# Patient Record
Sex: MALE | Race: WHITE | NOT HISPANIC OR LATINO | URBAN - METROPOLITAN AREA
[De-identification: names, ages, dates, MRNs, and addresses within clinical notes are randomized per-mention and may not be internally consistent; named-entity substitution may affect disease eponyms.]

---

## 2018-03-10 ENCOUNTER — EMERGENCY (EMERGENCY)
Facility: HOSPITAL | Age: 48
LOS: 0 days | Discharge: HOME | End: 2018-03-10
Attending: EMERGENCY MEDICINE | Admitting: EMERGENCY MEDICINE
Payer: COMMERCIAL

## 2018-03-10 VITALS
HEART RATE: 72 BPM | SYSTOLIC BLOOD PRESSURE: 121 MMHG | OXYGEN SATURATION: 99 % | RESPIRATION RATE: 17 BRPM | DIASTOLIC BLOOD PRESSURE: 76 MMHG

## 2018-03-10 VITALS — HEIGHT: 70 IN | WEIGHT: 274.92 LBS

## 2018-03-10 DIAGNOSIS — N23 UNSPECIFIED RENAL COLIC: ICD-10-CM

## 2018-03-10 DIAGNOSIS — R10.32 LEFT LOWER QUADRANT PAIN: ICD-10-CM

## 2018-03-10 LAB
ALBUMIN SERPL ELPH-MCNC: 4.1 G/DL — SIGNIFICANT CHANGE UP (ref 3.3–5)
ALP SERPL-CCNC: 54 U/L — SIGNIFICANT CHANGE UP (ref 40–120)
ALT FLD-CCNC: 34 U/L — SIGNIFICANT CHANGE UP (ref 12–78)
ANION GAP SERPL CALC-SCNC: 8 MMOL/L — SIGNIFICANT CHANGE UP (ref 5–17)
APPEARANCE UR: CLEAR — SIGNIFICANT CHANGE UP
AST SERPL-CCNC: 22 U/L — SIGNIFICANT CHANGE UP (ref 15–37)
BASOPHILS # BLD AUTO: 0.03 K/UL — SIGNIFICANT CHANGE UP (ref 0–0.2)
BASOPHILS NFR BLD AUTO: 0.4 % — SIGNIFICANT CHANGE UP (ref 0–2)
BILIRUB SERPL-MCNC: 0.6 MG/DL — SIGNIFICANT CHANGE UP (ref 0.2–1.2)
BILIRUB UR-MCNC: NEGATIVE — SIGNIFICANT CHANGE UP
BUN SERPL-MCNC: 15 MG/DL — SIGNIFICANT CHANGE UP (ref 7–23)
CALCIUM SERPL-MCNC: 9.2 MG/DL — SIGNIFICANT CHANGE UP (ref 8.5–10.1)
CHLORIDE SERPL-SCNC: 104 MMOL/L — SIGNIFICANT CHANGE UP (ref 96–108)
CO2 SERPL-SCNC: 26 MMOL/L — SIGNIFICANT CHANGE UP (ref 22–31)
COLOR SPEC: YELLOW — SIGNIFICANT CHANGE UP
CREAT SERPL-MCNC: 1.29 MG/DL — SIGNIFICANT CHANGE UP (ref 0.5–1.3)
DIFF PNL FLD: (no result)
EOSINOPHIL # BLD AUTO: 0.08 K/UL — SIGNIFICANT CHANGE UP (ref 0–0.5)
EOSINOPHIL NFR BLD AUTO: 1.1 % — SIGNIFICANT CHANGE UP (ref 0–6)
EPI CELLS # UR: SIGNIFICANT CHANGE UP
GLUCOSE SERPL-MCNC: 95 MG/DL — SIGNIFICANT CHANGE UP (ref 70–99)
GLUCOSE UR QL: NEGATIVE MG/DL — SIGNIFICANT CHANGE UP
HCT VFR BLD CALC: 42.4 % — SIGNIFICANT CHANGE UP (ref 39–50)
HGB BLD-MCNC: 14.6 G/DL — SIGNIFICANT CHANGE UP (ref 13–17)
IMM GRANULOCYTES NFR BLD AUTO: 0.4 % — SIGNIFICANT CHANGE UP (ref 0–1.5)
INR BLD: 1.05 RATIO — SIGNIFICANT CHANGE UP (ref 0.88–1.16)
KETONES UR-MCNC: NEGATIVE — SIGNIFICANT CHANGE UP
LEUKOCYTE ESTERASE UR-ACNC: NEGATIVE — SIGNIFICANT CHANGE UP
LYMPHOCYTES # BLD AUTO: 1.34 K/UL — SIGNIFICANT CHANGE UP (ref 1–3.3)
LYMPHOCYTES # BLD AUTO: 17.9 % — SIGNIFICANT CHANGE UP (ref 13–44)
MCHC RBC-ENTMCNC: 30.5 PG — SIGNIFICANT CHANGE UP (ref 27–34)
MCHC RBC-ENTMCNC: 34.4 GM/DL — SIGNIFICANT CHANGE UP (ref 32–36)
MCV RBC AUTO: 88.5 FL — SIGNIFICANT CHANGE UP (ref 80–100)
MONOCYTES # BLD AUTO: 0.63 K/UL — SIGNIFICANT CHANGE UP (ref 0–0.9)
MONOCYTES NFR BLD AUTO: 8.4 % — SIGNIFICANT CHANGE UP (ref 2–14)
NEUTROPHILS # BLD AUTO: 5.39 K/UL — SIGNIFICANT CHANGE UP (ref 1.8–7.4)
NEUTROPHILS NFR BLD AUTO: 71.8 % — SIGNIFICANT CHANGE UP (ref 43–77)
NITRITE UR-MCNC: NEGATIVE — SIGNIFICANT CHANGE UP
NRBC # BLD: 0 /100 WBCS — SIGNIFICANT CHANGE UP (ref 0–0)
PH UR: 6.5 — SIGNIFICANT CHANGE UP (ref 5–8)
PLATELET # BLD AUTO: 226 K/UL — SIGNIFICANT CHANGE UP (ref 150–400)
POTASSIUM SERPL-MCNC: 3.8 MMOL/L — SIGNIFICANT CHANGE UP (ref 3.5–5.3)
POTASSIUM SERPL-SCNC: 3.8 MMOL/L — SIGNIFICANT CHANGE UP (ref 3.5–5.3)
PROT SERPL-MCNC: 7.6 GM/DL — SIGNIFICANT CHANGE UP (ref 6–8.3)
PROT UR-MCNC: NEGATIVE MG/DL — SIGNIFICANT CHANGE UP
PROTHROM AB SERPL-ACNC: 11.4 SEC — SIGNIFICANT CHANGE UP (ref 9.8–12.7)
RBC # BLD: 4.79 M/UL — SIGNIFICANT CHANGE UP (ref 4.2–5.8)
RBC # FLD: 12.2 % — SIGNIFICANT CHANGE UP (ref 10.3–14.5)
RBC CASTS # UR COMP ASSIST: (no result) /HPF (ref 0–4)
SODIUM SERPL-SCNC: 138 MMOL/L — SIGNIFICANT CHANGE UP (ref 135–145)
SP GR SPEC: 1.01 — SIGNIFICANT CHANGE UP (ref 1.01–1.02)
UROBILINOGEN FLD QL: NEGATIVE MG/DL — SIGNIFICANT CHANGE UP
WBC # BLD: 7.5 K/UL — SIGNIFICANT CHANGE UP (ref 3.8–10.5)
WBC # FLD AUTO: 7.5 K/UL — SIGNIFICANT CHANGE UP (ref 3.8–10.5)
WBC UR QL: SIGNIFICANT CHANGE UP

## 2018-03-10 PROCEDURE — 74176 CT ABD & PELVIS W/O CONTRAST: CPT | Mod: 26

## 2018-03-10 PROCEDURE — 99285 EMERGENCY DEPT VISIT HI MDM: CPT

## 2018-03-10 RX ORDER — SODIUM CHLORIDE 9 MG/ML
1000 INJECTION INTRAMUSCULAR; INTRAVENOUS; SUBCUTANEOUS ONCE
Qty: 0 | Refills: 0 | Status: COMPLETED | OUTPATIENT
Start: 2018-03-10 | End: 2018-03-10

## 2018-03-10 RX ORDER — ONDANSETRON 8 MG/1
4 TABLET, FILM COATED ORAL ONCE
Qty: 0 | Refills: 0 | Status: COMPLETED | OUTPATIENT
Start: 2018-03-10 | End: 2018-03-10

## 2018-03-10 RX ADMIN — SODIUM CHLORIDE 1000 MILLILITER(S): 9 INJECTION INTRAMUSCULAR; INTRAVENOUS; SUBCUTANEOUS at 11:32

## 2018-03-10 NOTE — ED STATDOCS - PROGRESS NOTE DETAILS
47 yr. old male PMH: GERD, Sleep Apnea presents to ED with pain in left flank onset 2:30 am felt urge to void bu unable until 8 am after drinking fluids. Unable to sleep last night. No fever or chills. No N/V . Seen and examined by attending in Intake. Plan: IV,IVF, Labs, UA/UC, CT abd./pelvis. Will F/U with results and re evaluate. Matt NP

## 2018-03-10 NOTE — ED ADULT NURSE NOTE - OBJECTIVE STATEMENT
Pt presents with L sided flank pain, and urine urgency. Pt states "I feel like I have to go but cannot go" Pt denies any hematuria.

## 2018-03-10 NOTE — ED STATDOCS - MEDICAL DECISION MAKING DETAILS
Pt with renal colic, no signs of infection, normal renal function.  Okay for d/c home, supportive care.

## 2018-03-10 NOTE — ED ADULT TRIAGE NOTE - CHIEF COMPLAINT QUOTE
Pt presents to ED c/o left flank pain starting at 2am. Pt reports he hasn't urinated since 2am. Pt denies hematuria and N/V

## 2018-03-10 NOTE — ED STATDOCS - OBJECTIVE STATEMENT
46 y/o male with PMHx of GERD, sleep apnea presents to the ED c/o sharp left-sided flank pain that woke pt up around 2:30am. Went to restroom and attempted to return to sleep, but could not find a comfortable position that provided relief. Took Advil, Tylenol which offered no relief. Hydrated with fluids which offered mild relief. +increased urinary urgency with retention this morning. Denies burning in urination, hematuria.

## 2018-03-11 LAB
CULTURE RESULTS: NO GROWTH — SIGNIFICANT CHANGE UP
SPECIMEN SOURCE: SIGNIFICANT CHANGE UP

## 2018-05-22 ENCOUNTER — EMERGENCY (EMERGENCY)
Facility: HOSPITAL | Age: 48
LOS: 0 days | Discharge: HOME | End: 2018-05-22
Attending: EMERGENCY MEDICINE | Admitting: EMERGENCY MEDICINE
Payer: COMMERCIAL

## 2018-05-22 VITALS
OXYGEN SATURATION: 100 % | TEMPERATURE: 98 F | DIASTOLIC BLOOD PRESSURE: 96 MMHG | HEART RATE: 88 BPM | SYSTOLIC BLOOD PRESSURE: 130 MMHG | RESPIRATION RATE: 19 BRPM

## 2018-05-22 VITALS — WEIGHT: 279.99 LBS

## 2018-05-22 DIAGNOSIS — M54.5 LOW BACK PAIN: ICD-10-CM

## 2018-05-22 DIAGNOSIS — Z90.49 ACQUIRED ABSENCE OF OTHER SPECIFIED PARTS OF DIGESTIVE TRACT: Chronic | ICD-10-CM

## 2018-05-22 DIAGNOSIS — M54.30 SCIATICA, UNSPECIFIED SIDE: ICD-10-CM

## 2018-05-22 PROBLEM — G47.30 SLEEP APNEA, UNSPECIFIED: Chronic | Status: ACTIVE | Noted: 2018-03-10

## 2018-05-22 PROBLEM — K21.9 GASTRO-ESOPHAGEAL REFLUX DISEASE WITHOUT ESOPHAGITIS: Chronic | Status: ACTIVE | Noted: 2018-03-10

## 2018-05-22 PROCEDURE — 99283 EMERGENCY DEPT VISIT LOW MDM: CPT

## 2018-05-22 RX ORDER — KETOROLAC TROMETHAMINE 30 MG/ML
30 SYRINGE (ML) INJECTION ONCE
Qty: 0 | Refills: 0 | Status: DISCONTINUED | OUTPATIENT
Start: 2018-05-22 | End: 2018-05-22

## 2018-05-22 RX ORDER — DIAZEPAM 5 MG
5 TABLET ORAL ONCE
Qty: 0 | Refills: 0 | Status: DISCONTINUED | OUTPATIENT
Start: 2018-05-22 | End: 2018-05-22

## 2018-05-22 RX ORDER — LIDOCAINE 4 G/100G
1 CREAM TOPICAL ONCE
Qty: 0 | Refills: 0 | Status: COMPLETED | OUTPATIENT
Start: 2018-05-22 | End: 2018-05-22

## 2018-05-22 RX ADMIN — LIDOCAINE 1 PATCH: 4 CREAM TOPICAL at 10:46

## 2018-05-22 RX ADMIN — Medication 5 MILLIGRAM(S): at 10:46

## 2018-05-22 RX ADMIN — Medication 30 MILLIGRAM(S): at 10:46

## 2018-05-22 NOTE — ED STATDOCS - OBJECTIVE STATEMENT
46 y/o male with pmhx GERD and sleep apnea presents to the ED c/o  left lower back pain starting 4 days ago. Pt describes the pain as sharp, radiating down back of left leg, stopping at the knee, worsening w/ movement. No numbness / tingling or weakness. No urinary retention or stool incontinence. No fevers and denies trauma. patient was prescribed with flexeril at urgent care. Has also been using naproxen and ibuprofen w/o improvement of symptoms.

## 2018-05-22 NOTE — ED STATDOCS - ATTENDING CONTRIBUTION TO CARE
I, Francisco Plunkett, performed the initial face to face bedside interview with this patient regarding history of present illness, review of symptoms and relevant past medical, social and family history.  I completed an independent physical examination.  I was the initial provider who evaluated this patient. I have signed out the follow up of any pending tests (i.e. labs, radiological studies) to the Resident.  I have communicated the patient’s plan of care and disposition with the Resident.  The history, relevant review of systems, past medical and surgical history, medical decision making, and physical examination was documented by the scribe in my presence and I attest to the accuracy of the documentation.

## 2018-05-22 NOTE — ED PROVIDER NOTE - OBJECTIVE STATEMENT
46 y/o male w/ no pmh p/w back pain. Symptoms started w/ left lower back pain started 4 days ago. Described as sharp, radiating down back of left leg. Worse w/ movement. No numbness / tingling or weakness. No urinary retention or stool incontinence. No fevers and denies trauma. patient has been using flexeril, naproxen and ibuprofen w/o improvement of symptoms.

## 2018-05-22 NOTE — ED ADULT TRIAGE NOTE - CHIEF COMPLAINT QUOTE
Pt c/o back pain since friday, seen at [facility] on sunday placed on naproxen and flexiril with no relief. pt denies urinary symptoms, no fevers fall or trauma

## 2018-05-22 NOTE — ED STATDOCS - MEDICAL DECISION MAKING DETAILS
pt w/ back pain. Given hx and physical, suspicious for sciatica. Will provide anagleics, obs in ED, plan to dispo to home w/ pmd followup

## 2018-05-22 NOTE — ED STATDOCS - PROGRESS NOTE DETAILS
So Huang MD PGY4  patient reporting improvement of symptoms. ambulatory at bedside. will follow up with pmd, ortho spine referrals given

## 2018-05-22 NOTE — ED ADULT NURSE NOTE - OBJECTIVE STATEMENT
pt presents to eD with left lower back pain radiating down left lower extremity x 4 days.  denies recent injury. denies numbness/tingling. pt ambulates with pain. will; continue to montior and reassess

## 2018-05-22 NOTE — ED ADULT NURSE NOTE - CHIEF COMPLAINT QUOTE
Pt c/o back pain since friday, seen at [facility] on sunday placed on naproxen and flexiril with no relief. pt denies urinary symptoms, no fevers fall or trauma

## 2018-11-20 NOTE — ED ADULT NURSE NOTE - CAS ELECT INFOMATION PROVIDED
as per the pt " I was in the urgent care for cough and I had Syncopal episode I had the "
DC instructions

## 2019-10-04 PROBLEM — Z00.00 ENCOUNTER FOR PREVENTIVE HEALTH EXAMINATION: Status: ACTIVE | Noted: 2019-10-04

## 2019-10-08 ENCOUNTER — APPOINTMENT (OUTPATIENT)
Dept: UROLOGY | Facility: CLINIC | Age: 49
End: 2019-10-08
Payer: COMMERCIAL

## 2019-10-08 VITALS
SYSTOLIC BLOOD PRESSURE: 99 MMHG | OXYGEN SATURATION: 98 % | TEMPERATURE: 99.6 F | HEIGHT: 70 IN | BODY MASS INDEX: 26.63 KG/M2 | HEART RATE: 85 BPM | WEIGHT: 186 LBS | DIASTOLIC BLOOD PRESSURE: 67 MMHG

## 2019-10-08 DIAGNOSIS — N13.8 BENIGN PROSTATIC HYPERPLASIA WITH LOWER URINARY TRACT SYMPMS: ICD-10-CM

## 2019-10-08 DIAGNOSIS — Z86.69 PERSONAL HISTORY OF OTHER DISEASES OF THE NERVOUS SYSTEM AND SENSE ORGANS: ICD-10-CM

## 2019-10-08 DIAGNOSIS — Z86.39 PERSONAL HISTORY OF OTHER ENDOCRINE, NUTRITIONAL AND METABOLIC DISEASE: ICD-10-CM

## 2019-10-08 DIAGNOSIS — N40.1 BENIGN PROSTATIC HYPERPLASIA WITH LOWER URINARY TRACT SYMPMS: ICD-10-CM

## 2019-10-08 PROCEDURE — 99204 OFFICE O/P NEW MOD 45 MIN: CPT

## 2019-10-08 NOTE — REVIEW OF SYSTEMS
[see HPI] : see HPI [Fever] : fever [Chills] : chills [Feeling Tired] : feeling tired [Negative] : Heme/Lymph

## 2019-10-12 PROBLEM — Z86.69 HISTORY OF SLEEP APNEA: Status: RESOLVED | Noted: 2019-10-08 | Resolved: 2019-10-12

## 2019-10-12 PROBLEM — Z86.39 HISTORY OF HYPERCHOLESTEROLEMIA: Status: RESOLVED | Noted: 2019-10-08 | Resolved: 2019-10-12

## 2019-10-12 RX ORDER — MULTIVIT-MIN/IRON FUM/FOLIC AC 7.5 MG-4
TABLET ORAL
Refills: 0 | Status: ACTIVE | COMMUNITY

## 2019-10-12 NOTE — HISTORY OF PRESENT ILLNESS
[FreeTextEntry1] : 48 year old man seen 10/08/2019 with complaint of an episode of difficulty urinating 2 weeks ago and had a burning sensation when he was able to go. This began 2 weeks ago. Patient states he had some beer and had increased hesitancy and straining. Patient reports he had some dysuria after which has since resolved. Patient admits to hesitancy at times. \par It is moderate in severity. Nothing makes the symptoms better, drinking alcohol makes sx worse. \par It is associated with nothing.\par No nocturia, no hematuria, no frequency, no urgency,  No incontinence. \par No fevers, no chills, no nausea, no vomiting, no flank pain. \par \par No family history contributory to obstructive uropathy.\par

## 2019-10-12 NOTE — PHYSICAL EXAM
[General Appearance - Well Developed] : well developed [General Appearance - Well Nourished] : well nourished [Normal Appearance] : normal appearance [Well Groomed] : well groomed [General Appearance - In No Acute Distress] : no acute distress [Edema] : no peripheral edema [Respiration, Rhythm And Depth] : normal respiratory rhythm and effort [Exaggerated Use Of Accessory Muscles For Inspiration] : no accessory muscle use [Abdomen Soft] : soft [Abdomen Tenderness] : non-tender [Costovertebral Angle Tenderness] : no ~M costovertebral angle tenderness [Urethral Meatus] : meatus normal [Urinary Bladder Findings] : the bladder was normal on palpation [Scrotum] : the scrotum was normal [Testes Mass (___cm)] : there were no testicular masses [No Prostate Nodules] : no prostate nodules [Prostate Tenderness] : the prostate was not tender [Prostate Size ___ gm] : prostate size [unfilled] gm [Prostate Enlarged] : was enlarged [Normal Station and Gait] : the gait and station were normal for the patient's age [] : no rash [No Focal Deficits] : no focal deficits [Oriented To Time, Place, And Person] : oriented to person, place, and time [Affect] : the affect was normal [Not Anxious] : not anxious [Mood] : the mood was normal [No Palpable Adenopathy] : no palpable adenopathy [Prostate Nodule] : did not have a nodule [Prostate Tenderness] : was not tender [Prostate Fluctuant] : was not fluctuant

## 2019-10-12 NOTE — ASSESSMENT
[FreeTextEntry1] : 48 year old man presents with some hesitancy and straining consistent with BPH.\par \par The anatomy of the lower genitourinary tract was explained to the patient, with the urine passing through the bladder neck and prostatic urethra as it exits the body. Prostatic enlargement can constrict the lumen and the bladder outlet, causing incomplete bladder emptying and irritative symptoms such and frequency and urgency. Alpha blockers can be used to relax the bladder neck and facilitate passage of urine from the bladder. 5-alpha reductase inhibitors can be employed to shrink the prostate and thereby allow for passage of urine more easily. Anticholinergics to decrease bladder irritative symptoms were also discussed, but it was stressed that they can increase post void residual and risk urinary retention.\par \par Patient was asked to provide a urine specimen but never returned to complete the discussion of possible treatment options or post void residual.

## 2020-09-21 ENCOUNTER — APPOINTMENT (OUTPATIENT)
Dept: VASCULAR SURGERY | Facility: CLINIC | Age: 50
End: 2020-09-21
Payer: COMMERCIAL

## 2020-09-21 DIAGNOSIS — I83.893 VARICOSE VEINS OF BILATERAL LOWER EXTREMITIES WITH OTHER COMPLICATIONS: ICD-10-CM

## 2020-09-21 PROCEDURE — 93970 EXTREMITY STUDY: CPT

## 2020-09-21 PROCEDURE — 99203 OFFICE O/P NEW LOW 30 MIN: CPT

## 2020-09-22 NOTE — ASSESSMENT
[FreeTextEntry1] : 50 yo M presents for evaluation of bilateral LE varicose veins, L>R.\par Asymptomatic small varicosities of left calf.\par Venous doppler is unremarkable.\par No vascular issues at this time.\par F/u as necessary.

## 2020-09-22 NOTE — HISTORY OF PRESENT ILLNESS
[FreeTextEntry1] : 50 yo M presents for evaluation of bilateral LE varicose veins, L>R. His spouse noted his varicose veins therefore she wanted him to be evaluated and to r/o vascular disease. Patient denies pain, legs edema, claudication. No hx of DVT/SVT, no skin changes.

## 2020-09-22 NOTE — PHYSICAL EXAM
[Normal Breath Sounds] : Normal breath sounds [Normal Heart Sounds] : normal heart sounds [Normal Rate and Rhythm] : normal rate and rhythm [2+] : left 2+ [Varicose Veins Of Lower Extremities] : present [Ankle Swelling On The Right] : mild [No Rash or Lesion] : No rash or lesion [Calm] : calm [JVD] : no jugular venous distention  [Ankle Swelling (On Exam)] : not present [Abdomen Tenderness] : ~T ~M No abdominal tenderness [de-identified] : WN/WD, NAD [de-identified] : NC/AT [de-identified] : supple [de-identified] : +FROM [de-identified] : grossly intact

## 2020-09-22 NOTE — REVIEW OF SYSTEMS
[Negative] : Heme/Lymph [Leg Claudication] : no intermittent leg claudication [Limb Pain] : no limb pain [Limb Swelling] : no limb swelling

## 2021-07-20 NOTE — ED STATDOCS - PMH
Detail Level: Simple Plan: Use zinc oxide on lips while in the sun GERD (gastroesophageal reflux disease)    Sleep apnea

## 2023-05-30 ENCOUNTER — EMERGENCY (EMERGENCY)
Facility: HOSPITAL | Age: 53
LOS: 0 days | Discharge: HOME | End: 2023-05-31
Attending: STUDENT IN AN ORGANIZED HEALTH CARE EDUCATION/TRAINING PROGRAM
Payer: COMMERCIAL

## 2023-05-30 VITALS
HEART RATE: 79 BPM | RESPIRATION RATE: 18 BRPM | SYSTOLIC BLOOD PRESSURE: 122 MMHG | TEMPERATURE: 98 F | OXYGEN SATURATION: 100 % | DIASTOLIC BLOOD PRESSURE: 82 MMHG

## 2023-05-30 VITALS — WEIGHT: 184.97 LBS | HEIGHT: 69 IN

## 2023-05-30 DIAGNOSIS — Z23 ENCOUNTER FOR IMMUNIZATION: ICD-10-CM

## 2023-05-30 DIAGNOSIS — S01.01XA LACERATION WITHOUT FOREIGN BODY OF SCALP, INITIAL ENCOUNTER: ICD-10-CM
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
## 2023-05-30 DIAGNOSIS — Z87.19 PERSONAL HISTORY OF OTHER DISEASES OF THE DIGESTIVE SYSTEM: ICD-10-CM

## 2023-05-30 DIAGNOSIS — R21 RASH AND OTHER NONSPECIFIC SKIN ERUPTION: ICD-10-CM

## 2023-05-30 DIAGNOSIS — Z90.49 ACQUIRED ABSENCE OF OTHER SPECIFIED PARTS OF DIGESTIVE TRACT: Chronic | ICD-10-CM
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
## 2023-05-30 PROCEDURE — 12002 RPR S/N/AX/GEN/TRNK2.6-7.5CM: CPT

## 2023-05-30 PROCEDURE — 90715 TDAP VACCINE 7 YRS/> IM: CPT

## 2023-05-30 PROCEDURE — 90471 IMMUNIZATION ADMIN: CPT

## 2023-05-30 PROCEDURE — 71045 X-RAY EXAM CHEST 1 VIEW: CPT

## 2023-05-30 PROCEDURE — 70450 CT HEAD/BRAIN W/O DYE: CPT | Mod: 26,MA

## 2023-05-30 PROCEDURE — 70450 CT HEAD/BRAIN W/O DYE: CPT | Mod: MA

## 2023-05-30 PROCEDURE — 71045 X-RAY EXAM CHEST 1 VIEW: CPT | Mod: 26

## 2023-05-30 PROCEDURE — 72125 CT NECK SPINE W/O DYE: CPT | Mod: 26,MA

## 2023-05-30 PROCEDURE — 99285 EMERGENCY DEPT VISIT HI MDM: CPT | Mod: 25

## 2023-05-30 PROCEDURE — 72125 CT NECK SPINE W/O DYE: CPT | Mod: MA

## 2023-05-30 PROCEDURE — 99284 EMERGENCY DEPT VISIT MOD MDM: CPT | Mod: 25

## 2023-05-30 RX ORDER — TETANUS TOXOID, REDUCED DIPHTHERIA TOXOID AND ACELLULAR PERTUSSIS VACCINE, ADSORBED 5; 2.5; 8; 8; 2.5 [IU]/.5ML; [IU]/.5ML; UG/.5ML; UG/.5ML; UG/.5ML
0.5 SUSPENSION INTRAMUSCULAR ONCE
Refills: 0 | Status: COMPLETED | OUTPATIENT
Start: 2023-05-30 | End: 2023-05-30

## 2023-05-30 RX ADMIN — TETANUS TOXOID, REDUCED DIPHTHERIA TOXOID AND ACELLULAR PERTUSSIS VACCINE, ADSORBED 0.5 MILLILITER(S): 5; 2.5; 8; 8; 2.5 SUSPENSION INTRAMUSCULAR at 22:00

## 2023-05-30 NOTE — ED ADULT NURSE NOTE - NSFALLRISKINTERV_ED_ALL_ED

## 2023-05-30 NOTE — ED ADULT NURSE NOTE - NS ED NURSE LEVEL OF CONSCIOUSNESS ORIENTATION
Patient is requesting a refill of hydrochlorothiazide sent to [facility].       Oriented - self; Oriented - place; Oriented - time
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
## 2023-05-30 NOTE — ED STATDOCS - NS ED ATTENDING STATEMENT MOD
This was a shared visit with the RASHAWN. I reviewed and verified the documentation and independently performed the documented:
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
## 2023-05-30 NOTE — ED STATDOCS - NSDCPRINTRESULTS_ED_ALL_ED
Patient requests all Lab, Cardiology, and Radiology Results on their Discharge Instructions supple/Decrease ROM

## 2023-05-30 NOTE — ED STATDOCS - PATIENT PORTAL LINK FT
You can access the FollowMyHealth Patient Portal offered by [facility] by registering at the following website: [email or web address]. By joining [employer]’s FollowMyHealth portal, you will also be able to view your health information using other applications (apps) compatible with our system.

## 2023-05-30 NOTE — ED STATDOCS - NSFOLLOWUPINSTRUCTIONS_ED_ALL_ED_FT
Take motrin or tylenol for pain.   Apply ice to the area of pain.   Keep the wound clean and dry  Apply bacitracin 3-4 times a day.   Follow up to have your staples remove din 10 days.     Return to the Emergency Department for worsening or persistent symptoms, and/or ANY NEW OR CONCERNING SYMPTOMS. If you have issues obtaining follow up, please call: [phone] or [phone]  to obtain a doctor or specialist who takes your insurance in your area.     Laceration    WHAT YOU NEED TO KNOW:    A laceration is an injury to the skin and the soft tissue underneath it. Lacerations happen when you are cut or hit by something. They can happen anywhere on the body.     DISCHARGE INSTRUCTIONS:    Return to the emergency department if:     You have heavy bleeding or bleeding that does not stop after 10 minutes of holding firm, direct pressure over the wound.       Your wound opens up.     Contact your healthcare provider if:     You have a fever or chills.       Your laceration is red, warm, or swollen.      You have red streaks on your skin coming from your wound.      You have white or yellow drainage from the wound that smells bad.      You have pain that gets worse, even after treatment.       You have questions or concerns about your condition or care.     Medicines:     Prescription pain medicine may be given. Ask how to take this medicine safely.       Antibiotics help treat or prevent a bacterial infection.       Take your medicine as directed. Contact your healthcare provider if you think your medicine is not helping or if you have side effects. Tell him or her if you are allergic to any medicine. Keep a list of the medicines, vitamins, and herbs you take. Include the amounts, and when and why you take them. Bring the list or the pill bottles to follow-up visits. Carry your medicine list with you in case of an emergency.    Care for your wound as directed:     Do not get your wound wet until your healthcare provider says it is okay. Do not soak your wound in water. Do not go swimming until your healthcare provider says it is okay. Carefully wash the wound with soap and water. Gently pat the area dry or allow it to air dry.       Change your bandages when they get wet, dirty, or after washing. Apply new, clean bandages as directed. Do not apply elastic bandages or tape too tight. Do not put powders or lotions over your incision.       Apply antibiotic ointment as directed. Your healthcare provider may give you antibiotic ointment to put over your wound if you have stitches. If you have strips of tape over your incision, let them dry up and fall off on their own. If they do not fall off within 14 days, gently remove them. If you have glue over your wound, do not remove or pick at it. If your glue comes off, do not replace it with glue that you have at home.       Check your wound every day for signs of infection such as swelling, redness, or pus.     Self-care:     Apply ice on your wound for 15 to 20 minutes every hour or as directed. Use an ice pack, or put crushed ice in a plastic bag. Cover it with a towel. Ice helps prevent tissue damage and decreases swelling and pain.      Use a splint as directed. A splint will decrease movement and stress on your wound. It may help it heal faster. A splint may be used for lacerations over joints or areas of your body that bend. Ask your healthcare provider how to apply and remove a splint.       Decrease scarring of your wound by applying ointments as directed. Do not apply ointments until your healthcare provider says it is okay. You may need to wait until your wound is healed. Ask which ointment to buy and how often to use it. After your wound is healed, use sunscreen over the area when you are out in the sun. You should do this for at least 6 months to 1 year after your injury.     Follow up with your healthcare provider as directed: You may need to follow up in 24 to 48 hours to have your wound checked for infection. You will need to return in 10 days if you have staples so they can be removed. Care for your wound as directed to prevent infection and help it heal. Write down your questions so you remember to ask them during your visits.

## 2023-05-30 NOTE — ED STATDOCS - ATTENDING APP SHARED VISIT CONTRIBUTION OF CARE
I, Mina Garcia, DO personally saw the patient with RASHAWN.  I have personally performed a face to face diagnostic evaluation on this patient.  I have reviewed the RASHAWN note and agree with the history, exam, and plan of care, except as noted.  I personally saw the patient and performed a substantive portion of the visit including all aspects of the medical decision making.

## 2023-08-24 ENCOUNTER — APPOINTMENT (OUTPATIENT)
Dept: VASCULAR SURGERY | Facility: CLINIC | Age: 53
End: 2023-08-24
Payer: COMMERCIAL

## 2023-08-24 VITALS
TEMPERATURE: 70 F | OXYGEN SATURATION: 97 % | BODY MASS INDEX: 27.12 KG/M2 | RESPIRATION RATE: 14 BRPM | DIASTOLIC BLOOD PRESSURE: 72 MMHG | WEIGHT: 181 LBS | HEART RATE: 70 BPM | SYSTOLIC BLOOD PRESSURE: 113 MMHG | HEIGHT: 68.5 IN

## 2023-08-24 DIAGNOSIS — I83.90 ASYMPTOMATIC VARICOSE VEINS OF UNSPECIFIED LOWER EXTREMITY: ICD-10-CM

## 2023-08-24 DIAGNOSIS — I87.2 VENOUS INSUFFICIENCY (CHRONIC) (PERIPHERAL): ICD-10-CM

## 2023-08-24 PROCEDURE — 93970 EXTREMITY STUDY: CPT

## 2023-08-24 PROCEDURE — 99203 OFFICE O/P NEW LOW 30 MIN: CPT

## 2023-11-15 NOTE — ED ADULT NURSE NOTE - RN DISCHARGE SIGNATURE
Derm clinic note-medical      Dermatology Problem List:  1. Acne vulgaris  - current tx: spironolactone, differin 0.3% gel  - previous tx: doxycycline 100 mg PO BID, tretinoin 0.025% cream, clindamycin 1% lotion, BP 4-5% wash  2. Hair loss  - Current tx: spironolactone, Rogaine, Dermasmoothe oil, HS royal oils, ILK injections, topical minoxidil, doxycyline hyclate  3. Grandma with hair loss and aunt, no first degree relatives with hair loss to her knowledge  No family or personal history of fibroids or breast cancer.     Hx of tubal ligation      ____________________________________________     Assessment & Plan:     # Hair loss, most consistent with CCCA or AGA - chronic for years and active- feels stable   - Continue spironolactone 50 mg daily.  Pregnancy protection is on board (typo in last note)  - Continue OTC topical minoxidil, shee feels its. Hold if irritating.   - not change to topical minoxidil           # Vitamin D deficiency, started on replacement  - pt is taking     # Acne vulgaris  Status wants to continue, want to try peel, reviewed hold retinoids prior  Have discussed skin peels at last visit (05/24/2023)  - hold differin  -metrocream  Start doxycycline 100 mg BID. Recommend that patient try tocalcium-containing products around the time of taking the medication.  Instructed to take with a full glass of fluid and food, not lying down.  Side effects of photosensitivity, headaches, GI upset discussed. Recommend sun protection.  Tubal ligation.   - Continue spironolactone 50 mg daily     PIH- peel increase to vitalize      S: pt reports acne still active    [place] better[facility]      O: has acneiform papules with hyperpigmentation      SEE COSMETIC RECORD         10-Mar-2018

## 2023-12-18 PROBLEM — I87.2 VENOUS INSUFFICIENCY: Status: ACTIVE | Noted: 2023-12-18

## 2023-12-18 NOTE — HISTORY OF PRESENT ILLNESS
[FreeTextEntry1] : 53 y/o male presents to the vascular clinic c/o varicose veins of LLE for the past few years. States that the veins have become more prominent in the past 3-4 months. Denies pain, pruritus, swelling, and weakness of LLE.   PMHx: BPH PSH: Bariatric surgery Allergies: NKDA Social hx: Nonsmoker, drinks 4 shots of hard liquor daily, denies illicit drug use.

## 2023-12-18 NOTE — PHYSICAL EXAM
[Normal Rate and Rhythm] : normal rate and rhythm [2+] : left 2+ [Varicose Veins Of Lower Extremities] : present [Varicose Veins Of The Left Leg] : of the left leg [Ankle Swelling On The Right] : mild [No Rash or Lesion] : No rash or lesion [Alert] : alert [Oriented to Person] : oriented to person [Oriented to Place] : oriented to place [Oriented to Time] : oriented to time [Ankle Swelling (On Exam)] : not present [] : not present [Calm] : calm [de-identified] : Well groomed male in no acute distress [de-identified] : MINGO ANDERSON [de-identified] : Unlabored breathing, no respiratory distress [de-identified] : Motor and sensation intact of lower extremities [de-identified] : Warm to touch

## 2023-12-18 NOTE — ASSESSMENT
[FreeTextEntry1] : 51 y/o male presents to the vascular clinic with asymptomatic varicose vein of LLE. On exam his varicose vein extends from medial L thigh to the popliteal fossa and calf. Venous duplex of lower extremity showed reflux of GSV on LLE. Discussed with patient about in office treatment options, but would not advise intervention in a patient who is asymptomatic. He will return to the office if he develops symptoms of venous insufficiency, and has been counseled on what those symptoms are. [Arterial/Venous Disease] : arterial/venous disease [Foot care/Footwear] : foot care/footwear
